# Patient Record
Sex: FEMALE | Race: WHITE | NOT HISPANIC OR LATINO | Employment: OTHER | URBAN - METROPOLITAN AREA
[De-identification: names, ages, dates, MRNs, and addresses within clinical notes are randomized per-mention and may not be internally consistent; named-entity substitution may affect disease eponyms.]

---

## 2017-01-31 ENCOUNTER — OFFICE VISIT (OUTPATIENT)
Dept: NEUROLOGY | Facility: CLINIC | Age: 52
End: 2017-01-31
Payer: COMMERCIAL

## 2017-01-31 VITALS
RESPIRATION RATE: 18 BRPM | HEART RATE: 68 BPM | WEIGHT: 187.19 LBS | HEIGHT: 60 IN | BODY MASS INDEX: 36.75 KG/M2 | DIASTOLIC BLOOD PRESSURE: 80 MMHG | SYSTOLIC BLOOD PRESSURE: 118 MMHG

## 2017-01-31 DIAGNOSIS — R41.3 MEMORY LOSS: ICD-10-CM

## 2017-01-31 DIAGNOSIS — R51.9 NONINTRACTABLE EPISODIC HEADACHE, UNSPECIFIED HEADACHE TYPE: ICD-10-CM

## 2017-01-31 DIAGNOSIS — E53.8 B12 DEFICIENCY: Primary | ICD-10-CM

## 2017-01-31 PROCEDURE — 99214 OFFICE O/P EST MOD 30 MIN: CPT | Mod: S$GLB,,, | Performed by: PSYCHIATRY & NEUROLOGY

## 2017-01-31 PROCEDURE — 1159F MED LIST DOCD IN RCRD: CPT | Mod: S$GLB,,, | Performed by: PSYCHIATRY & NEUROLOGY

## 2017-01-31 PROCEDURE — 99999 PR PBB SHADOW E&M-EST. PATIENT-LVL III: CPT | Mod: PBBFAC,,, | Performed by: PSYCHIATRY & NEUROLOGY

## 2017-01-31 RX ORDER — KETOROLAC TROMETHAMINE 10 MG/1
TABLET, FILM COATED ORAL
Qty: 10 TABLET | Refills: 1 | Status: SHIPPED | OUTPATIENT
Start: 2017-01-31 | End: 2018-05-09

## 2017-01-31 NOTE — MR AVS SNAPSHOT
[facility]  [address] [facility] [address]  Phone: [phone]  Fax: [phone]                  Lydia Aguilera   2017 10:15 AM   Office Visit    Description:  Female : 1965   Provider:  Shawn Butler MD   Department:  [facility]           Reason for Visit     Headache     Memory Loss           Diagnoses this Visit        Comments    B12 deficiency    -  Primary     Memory loss         Nonintractable episodic headache, unspecified headache type                To Do List           Goals (5 Years of Data)     None      Follow-Up and Disposition     Return in about 6 months (around 2017).       These Medications        Disp Refills Start End    ketorolac (TORADOL) 10 mg tablet 10 tablet 1 2017     Take one daily PRN for headache    Pharmacy: [facility] [id number] [place] [address] E [facility] AT [facility] Ph #: [phone]         [facility] On Call     [facility] On Call Nurse Care Line -  Assistance  Registered nurses in the [facility] On Call Center provide clinical advisement, health education, appointment booking, and other advisory services.  Call for this free service at [phone].             Medications           Message regarding Medications     Verify the changes and/or additions to your medication regime listed below are the same as discussed with your clinician today.  If any of these changes or additions are incorrect, please notify your healthcare provider.        START taking these NEW medications        Refills    ketorolac (TORADOL) 10 mg tablet 1    Sig: Take one daily PRN for headache    Class: Normal      STOP taking these medications     nortriptyline (PAMELOR) 10 MG capsule Take one nightly for 2 weeks, then may increase to 2 at night    butalbital-acetaminophen-caffeine -40 mg (FIORICET, ESGIC) -40 mg per tablet Take 1 tablet by mouth every 4 (four) hours as needed  for Pain.           Verify that the below list of medications is an accurate representation of the medications you are currently taking.  If none reported, the list may be blank. If incorrect, please contact your healthcare provider. Carry this list with you in case of emergency.           Current Medications     PHENAZOPYRIDINE HCL (AZO ORAL) Take 1 tablet by mouth continuous prn.    ketorolac (TORADOL) 10 mg tablet Take one daily PRN for headache           Clinical Reference Information           Vital Signs - Last Recorded  Most recent update: 1/31/2017 10:57 AM by Marlen Larry MA    BP Pulse Resp Ht Wt BMI    118/80 (BP Location: Left arm, Patient Position: Sitting, BP Method: Manual) 68 18 5' (1.524 m) 84.9 kg (187 lb 2.7 oz) 36.55 kg/m2      Blood Pressure          Most Recent Value    BP  118/80      Allergies as of 1/31/2017     Demerol [Meperidine]      Immunizations Administered on Date of Encounter - 1/31/2017     None

## 2017-01-31 NOTE — PROGRESS NOTES
"HPI:  Lydia Aguilera is a 51 y.o. female with  2-5 years short term memory loss and clear stress/ mood disorder with prominent depression. MRI shows encephalomalacia/ may be due to prior insult/ hx of meningitis as a child. Low normal B12 level  Patient has not been seen since 2014  She states she has some memory problems that her "off and on" but not causing her to seek medical care. She is functioning well daily- doing her own bills (actually maintaining 3 check books well) and keeping up her household. She is not working by choice.   Since the last visit, the patient had some periods without headache but currently has had headaches again at times, often on the right side at times and this is not usually severe. She has no aura with headache. Currently no light or sound sensitivity with headaches and headaches are mild. She is not requiring anything PRN headache often, but uses rare tylenol PRN.   She is not currently taking B12.  She has a long history of headaches prior. She has not had a headache for a few weeks now.   Mood has been good.     Review of Systems   Constitutional: Negative for fever.   HENT: Negative for nosebleeds.    Eyes: Negative for double vision.   Respiratory: Negative for hemoptysis.    Cardiovascular: Negative for leg swelling.   Gastrointestinal: Negative for blood in stool.   Genitourinary: Negative for hematuria.   Musculoskeletal: Negative for falls.   Skin: Negative for rash.   Neurological: Negative for seizures.   Psychiatric/Behavioral: The patient has insomnia.            Exam:  Gen Appearance, well developed/nourished in no apparent distress  CV: 2+ distal pulses with no edema or swelling  Neuro:  MS: Awake, alert,  Sustains attention. Recent recall is normal /remote memory intact, Language is full to spontaneous speech/comprehension. Fund of Knowledge is full  CN: Optic discs are flat with normal vasculature, PERRL, Extraoccular movements and visual fields are full. " Normal facial sensation and strength, Hearing symmetric, Tongue and Palate are midline and strong. Shoulder Shrug symmetric and strong.  Motor: Normal bulk, tone, no abnormal movements. 5/5 strength bilateral upper/lower extremities with 2+ reflexes and no clonus  Sensory: Romberg negative and intact to temp and vibration  Cerebellar: Finger-nose,Rapid alternating movements intact  Gait: Normal stance, no ataxia        MRI brain reports and images reviewed:FOCAL AREA OF ENCEPHALOMALACIA IN THE  RIGHT OCCIPITAL REGION; OTHERWISE, NORMAL MRI OF THE  BRAIN WITHOUT AND WITH CONTRAST    In follow up Holter and Carotid US is normal and echo showed mildly reduced ef/ she was sent to cardiologist-- appointment pending    CT head 2014: 1.  No CT evidence of an acute intracranial abnormality.  2.  Mild atrophy and encephalomalacia within the right parietal occipital region.  3.  Left sphenoid sinus disease.        Assessment/Plan: Lydia Aguilera is a 51 y.o. female with  Prior report of short term memory loss and clear stress/ mood disorder with prominent depression. MRI shows encephalomalacia/ may be due to prior insult/ hx of meningitis as a child. Low normal B12 level. Long history of episodic headaches as well  I recommend:     1. Restart 1000mcg B12 daily. Check levels at next visit. This could contribute to vague subjective memory loss. Reporting normal mood now.   2. Memory was better with better mood prior.   3. No further work up for headaches as she had had them prior and spells are rare and episodic. Notify me if worse spells or any changes. Try Toradol PRN per orders. Black box CV risk, renal and GI risk review.   4. Previously: She responded with PT for dizziness    RTC in 6 months

## 2018-05-09 ENCOUNTER — HOSPITAL ENCOUNTER (OUTPATIENT)
Dept: RADIOLOGY | Facility: HOSPITAL | Age: 53
Discharge: HOME | End: 2018-05-09
Attending: NURSE PRACTITIONER
Payer: MEDICAID

## 2018-05-09 ENCOUNTER — OFFICE VISIT (OUTPATIENT)
Dept: NEUROLOGY | Facility: CLINIC | Age: 53
End: 2018-05-09
Payer: MEDICAID

## 2018-05-09 VITALS
BODY MASS INDEX: 37.09 KG/M2 | DIASTOLIC BLOOD PRESSURE: 88 MMHG | SYSTOLIC BLOOD PRESSURE: 124 MMHG | WEIGHT: 188.94 LBS | HEIGHT: 60 IN | HEART RATE: 50 BPM | RESPIRATION RATE: 16 BRPM

## 2018-05-09 DIAGNOSIS — R00.1 BRADYCARDIA: ICD-10-CM

## 2018-05-09 DIAGNOSIS — R20.2 NUMBNESS AND TINGLING IN RIGHT HAND: ICD-10-CM

## 2018-05-09 DIAGNOSIS — R20.0 NUMBNESS AND TINGLING IN RIGHT HAND: ICD-10-CM

## 2018-05-09 PROCEDURE — 72040 X-RAY EXAM NECK SPINE 2-3 VW: CPT | Mod: 26,,, | Performed by: RADIOLOGY

## 2018-05-09 PROCEDURE — 72040 X-RAY EXAM NECK SPINE 2-3 VW: CPT | Mod: TC

## 2018-05-09 PROCEDURE — 99214 OFFICE O/P EST MOD 30 MIN: CPT | Mod: S$PBB,,, | Performed by: NURSE PRACTITIONER

## 2018-05-09 PROCEDURE — 99999 PR PBB SHADOW E&M-EST. PATIENT-LVL III: CPT | Mod: PBBFAC,,, | Performed by: NURSE PRACTITIONER

## 2018-05-09 PROCEDURE — 99213 OFFICE O/P EST LOW 20 MIN: CPT | Mod: PBBFAC,25 | Performed by: NURSE PRACTITIONER

## 2018-05-09 RX ORDER — MELOXICAM 7.5 MG/1
7.5 TABLET ORAL DAILY
COMMUNITY
End: 2019-07-25

## 2018-05-09 NOTE — PROGRESS NOTES
"HPI:  Lydia Aguilera is a 51 y.o. female with  2-5 years short term memory loss and clear stress/ mood disorder with prominent depression. MRI shows encephalomalacia/ may be due to prior insult/ hx of meningitis as a child. Low normal B12 level.     Patient presents today with complaint of right hand tingling. She has not been seen since 1/2017, and did not follow up.     Her right hand tingling began last month. She wakes up at night with numbness to her right hand. She denies any neck pain. She denies dropping items or difficulty opening items. 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     Memory and mood are "good" at this time. She is taking B12 supplements.     Headaches are resolved currently.     No dizziness.     Review of Systems   Constitutional: Negative for fever.   HENT: Negative for nosebleeds.    Eyes: Negative for double vision.   Respiratory: Negative for hemoptysis.    Cardiovascular: Negative for leg swelling.   Gastrointestinal: Negative for blood in stool.   Genitourinary: Negative for hematuria.   Musculoskeletal: Positive for neck pain. Negative for falls.   Skin: Negative for rash.   Neurological: Positive for tingling and sensory change. Negative for dizziness, tremors, speech change, focal weakness, seizures and headaches.   Psychiatric/Behavioral: Negative for depression, hallucinations and memory loss. The patient is not nervous/anxious and does not have insomnia.      Exam:  Gen Appearance, well developed/nourished in no apparent distress  CV: 2+ distal pulses with no edema or swelling  Neuro:  MS: Awake, alert,  Sustains attention. Recent recall is normal /remote memory intact, Language is full to spontaneous speech/comprehension. Fund of Knowledge is full  CN: Optic discs are flat with normal vasculature, PERRL, Extraoccular movements and visual fields are full. Normal facial sensation and strength, Hearing symmetric, Tongue and Palate are midline and strong. Shoulder Shrug " symmetric and strong.  Motor: Normal bulk, tone, no abnormal movements. 5/5 strength bilateral upper/lower extremities with 2+ reflexes and no clonus  Sensory: Romberg negative and intact to temp and vibration; negative Tinel's and Phalen's test bilaterally.   Cerebellar: Finger-nose,Rapid alternating movements intact  Gait: Normal stance, no ataxia  MSK survey: no tenderness to the T-spine    Imaging:    MRI brain reports and images reviewed:FOCAL AREA OF ENCEPHALOMALACIA IN THE  RIGHT OCCIPITAL REGION; OTHERWISE, NORMAL MRI OF THE  BRAIN WITHOUT AND WITH CONTRAST    In follow up Holter and Carotid US is normal and echo showed mildly reduced ef/ she was sent to cardiologist-- appointment pending    CT head 2014:   1.  No CT evidence of an acute intracranial abnormality.  2.  Mild atrophy and encephalomalacia within the right parietal occipital region.  3.  Left sphenoid sinus disease.    Assessment/Plan: Lydia Aguilera is a 52 y.o. female with  Prior report of short term memory loss and clear stress/ mood disorder with prominent depression. MRI shows encephalomalacia/ may be due to prior insult/ hx of meningitis as a child. Low normal B12 level. Long history of episodic headaches as wel.     I recommend:   1. EMG BUE to assess for entrapment and polyneuropathy, as well as cervical radicular disease to explain her right hand numbness.   2. C-spine X-rays to assess for degenerative changes. Consider MRI C-spine pending results.   3. Continue B12 supplements for subjective memory loss prior.   4. Headaches resolved currently. She was prescribed Toradol prn prior.   5. Previously: She responded with PT for dizziness    RTC in 3 months.

## 2018-05-17 PROBLEM — Z12.11 SCREENING FOR COLON CANCER: Status: ACTIVE | Noted: 2018-05-17

## 2018-06-22 ENCOUNTER — PROCEDURE VISIT (OUTPATIENT)
Dept: NEUROLOGY | Facility: CLINIC | Age: 53
End: 2018-06-22
Payer: MEDICAID

## 2018-06-22 DIAGNOSIS — R20.0 NUMBNESS AND TINGLING IN RIGHT HAND: ICD-10-CM

## 2018-06-22 DIAGNOSIS — G56.01 RIGHT CARPAL TUNNEL SYNDROME: Primary | ICD-10-CM

## 2018-06-22 DIAGNOSIS — R20.2 NUMBNESS AND TINGLING IN RIGHT HAND: ICD-10-CM

## 2018-06-22 PROCEDURE — 95909 NRV CNDJ TST 5-6 STUDIES: CPT | Mod: 26,S$PBB,, | Performed by: PSYCHIATRY & NEUROLOGY

## 2018-06-22 PROCEDURE — 95886 MUSC TEST DONE W/N TEST COMP: CPT | Mod: 26,S$PBB,, | Performed by: PSYCHIATRY & NEUROLOGY

## 2018-06-22 PROCEDURE — 95886 MUSC TEST DONE W/N TEST COMP: CPT | Mod: PBBFAC | Performed by: PSYCHIATRY & NEUROLOGY

## 2018-06-22 PROCEDURE — 95909 NRV CNDJ TST 5-6 STUDIES: CPT | Mod: PBBFAC | Performed by: PSYCHIATRY & NEUROLOGY

## 2018-06-22 NOTE — PROCEDURES
EMG W/ ULTRASOUND AND NERVE CONDUCTION TEST 2 Extremities  Date/Time: 6/22/2018 4:35 PM  Performed by: PRIYANKA BUTLER  Authorized by: MILTON WALTERS       REPORT OF EMG and NERVE CONDUCTION STUDY    Name: Lydia Aguilera  Date of Study:  6/22/18  Referring Provider: ALEX Walters  Test Performed by:  MD Luke  Full Values Attached  Informed Consent Scanned.   No anesthesia used.   Amount of Blood Loss: none. The patient tolerated this procedure well.       Informed consent was obtained prior to performing this study. Two patient identifiers were confirmed with the patient prior to performing this study. A time out to determine correct patient and and agreement on procedure performed was conducted prior to the concentric needle examination.    Reason for the study:        Findings: Patient refused left sided testing.  Nerve conduction studies of the right median (motor and sensory)nerves, bilateral ulnar (motor and sensory) nerves,and right  radial sensory nerve were performed.  Right median distal latency was prolonged to 5.0ms and right median palm to wrist latency was prolonged to 3.9ms.   Otherwise, amplitudes, distal latencies, conduction velocities, and F-waves were normal. H reflexes were symetric  EMG of selected muscles of the right  And right cervical paraspinal muscleswere performed as indicated on the attached sheets.  Insertional activity was normal without fasciculation or fibrillation, normal sized and phasia of motor units.      Impression:  Abnormal Study secondary to the Presence of:    Mild Right Carpal Tunnel syndrome. Left side not tested as above but is not symptomatic    Priyanka Butler M.D. [facility].     Recommendations (discussed at this visit):  Wear wrist brace for CTS nightly and RTC in 6 weeks.     CC: ALEX Walters

## 2019-07-25 ENCOUNTER — LAB VISIT (OUTPATIENT)
Dept: LAB | Facility: HOSPITAL | Age: 54
End: 2019-07-25
Attending: NURSE PRACTITIONER
Payer: MEDICAID

## 2019-07-25 ENCOUNTER — OFFICE VISIT (OUTPATIENT)
Dept: NEUROLOGY | Facility: CLINIC | Age: 54
End: 2019-07-25
Payer: MEDICAID

## 2019-07-25 VITALS
HEIGHT: 61 IN | WEIGHT: 178.13 LBS | RESPIRATION RATE: 14 BRPM | DIASTOLIC BLOOD PRESSURE: 84 MMHG | SYSTOLIC BLOOD PRESSURE: 132 MMHG | HEART RATE: 50 BPM | BODY MASS INDEX: 33.63 KG/M2

## 2019-07-25 DIAGNOSIS — R00.1 BRADYCARDIA: ICD-10-CM

## 2019-07-25 DIAGNOSIS — R47.89 WORD FINDING DIFFICULTY: ICD-10-CM

## 2019-07-25 DIAGNOSIS — Z86.61 HISTORY OF MENINGITIS: ICD-10-CM

## 2019-07-25 DIAGNOSIS — G56.01 RIGHT CARPAL TUNNEL SYNDROME: ICD-10-CM

## 2019-07-25 DIAGNOSIS — H91.92 DEAFNESS IN LEFT EAR: ICD-10-CM

## 2019-07-25 DIAGNOSIS — R41.3 MEMORY LOSS: Primary | ICD-10-CM

## 2019-07-25 DIAGNOSIS — G93.89 ENCEPHALOMALACIA: ICD-10-CM

## 2019-07-25 DIAGNOSIS — R20.0 NUMBNESS AND TINGLING IN RIGHT HAND: ICD-10-CM

## 2019-07-25 DIAGNOSIS — R20.2 NUMBNESS AND TINGLING IN RIGHT HAND: ICD-10-CM

## 2019-07-25 DIAGNOSIS — R41.3 MEMORY LOSS: ICD-10-CM

## 2019-07-25 PROBLEM — Z12.11 SCREENING FOR COLON CANCER: Status: RESOLVED | Noted: 2018-05-17 | Resolved: 2019-07-25

## 2019-07-25 LAB
ALBUMIN SERPL BCP-MCNC: 4.3 G/DL (ref 3.5–5.2)
ALP SERPL-CCNC: 90 U/L (ref 55–135)
ALT SERPL W/O P-5'-P-CCNC: 32 U/L (ref 10–44)
ANION GAP SERPL CALC-SCNC: 9 MMOL/L (ref 8–16)
AST SERPL-CCNC: 25 U/L (ref 10–40)
BASOPHILS # BLD AUTO: 0.03 K/UL (ref 0–0.2)
BASOPHILS NFR BLD: 0.5 % (ref 0–1.9)
BILIRUB SERPL-MCNC: 0.4 MG/DL (ref 0.1–1)
BUN SERPL-MCNC: 9 MG/DL (ref 6–20)
CALCIUM SERPL-MCNC: 10.2 MG/DL (ref 8.7–10.5)
CHLORIDE SERPL-SCNC: 104 MMOL/L (ref 95–110)
CO2 SERPL-SCNC: 29 MMOL/L (ref 23–29)
CREAT SERPL-MCNC: 0.8 MG/DL (ref 0.5–1.4)
DIFFERENTIAL METHOD: ABNORMAL
EOSINOPHIL # BLD AUTO: 0.2 K/UL (ref 0–0.5)
EOSINOPHIL NFR BLD: 2.8 % (ref 0–8)
ERYTHROCYTE [DISTWIDTH] IN BLOOD BY AUTOMATED COUNT: 12 % (ref 11.5–14.5)
EST. GFR  (AFRICAN AMERICAN): >60 ML/MIN/1.73 M^2
EST. GFR  (NON AFRICAN AMERICAN): >60 ML/MIN/1.73 M^2
GLUCOSE SERPL-MCNC: 89 MG/DL (ref 70–110)
HCT VFR BLD AUTO: 40.2 % (ref 37–48.5)
HGB BLD-MCNC: 13.5 G/DL (ref 12–16)
IMM GRANULOCYTES # BLD AUTO: 0.02 K/UL (ref 0–0.04)
IMM GRANULOCYTES NFR BLD AUTO: 0.3 % (ref 0–0.5)
LYMPHOCYTES # BLD AUTO: 2.8 K/UL (ref 1–4.8)
LYMPHOCYTES NFR BLD: 45.9 % (ref 18–48)
MCH RBC QN AUTO: 30.6 PG (ref 27–31)
MCHC RBC AUTO-ENTMCNC: 33.6 G/DL (ref 32–36)
MCV RBC AUTO: 91 FL (ref 82–98)
MONOCYTES # BLD AUTO: 0.4 K/UL (ref 0.3–1)
MONOCYTES NFR BLD: 6.3 % (ref 4–15)
NEUTROPHILS # BLD AUTO: 2.7 K/UL (ref 1.8–7.7)
NEUTROPHILS NFR BLD: 44.2 % (ref 38–73)
NRBC BLD-RTO: 0 /100 WBC
PLATELET # BLD AUTO: 372 K/UL (ref 150–350)
PMV BLD AUTO: 9.4 FL (ref 9.2–12.9)
POTASSIUM SERPL-SCNC: 3.8 MMOL/L (ref 3.5–5.1)
PROT SERPL-MCNC: 7.1 G/DL (ref 6–8.4)
RBC # BLD AUTO: 4.41 M/UL (ref 4–5.4)
SODIUM SERPL-SCNC: 142 MMOL/L (ref 136–145)
T4 SERPL-MCNC: 6.5 UG/DL (ref 4.5–11.5)
TSH SERPL DL<=0.005 MIU/L-ACNC: 3.2 UIU/ML (ref 0.4–4)
WBC # BLD AUTO: 6.03 K/UL (ref 3.9–12.7)

## 2019-07-25 PROCEDURE — 82607 VITAMIN B-12: CPT

## 2019-07-25 PROCEDURE — 84436 ASSAY OF TOTAL THYROXINE: CPT

## 2019-07-25 PROCEDURE — 84443 ASSAY THYROID STIM HORMONE: CPT

## 2019-07-25 PROCEDURE — 99999 PR PBB SHADOW E&M-EST. PATIENT-LVL IV: ICD-10-PCS | Mod: PBBFAC,,, | Performed by: NURSE PRACTITIONER

## 2019-07-25 PROCEDURE — 99999 PR PBB SHADOW E&M-EST. PATIENT-LVL IV: CPT | Mod: PBBFAC,,, | Performed by: NURSE PRACTITIONER

## 2019-07-25 PROCEDURE — 85025 COMPLETE CBC W/AUTO DIFF WBC: CPT

## 2019-07-25 PROCEDURE — 99214 OFFICE O/P EST MOD 30 MIN: CPT | Mod: PBBFAC | Performed by: NURSE PRACTITIONER

## 2019-07-25 PROCEDURE — 82306 VITAMIN D 25 HYDROXY: CPT

## 2019-07-25 PROCEDURE — 82746 ASSAY OF FOLIC ACID SERUM: CPT

## 2019-07-25 PROCEDURE — 86038 ANTINUCLEAR ANTIBODIES: CPT

## 2019-07-25 PROCEDURE — 80053 COMPREHEN METABOLIC PANEL: CPT

## 2019-07-25 PROCEDURE — 86592 SYPHILIS TEST NON-TREP QUAL: CPT

## 2019-07-25 PROCEDURE — 36415 COLL VENOUS BLD VENIPUNCTURE: CPT

## 2019-07-25 PROCEDURE — 99215 OFFICE O/P EST HI 40 MIN: CPT | Mod: S$PBB,,, | Performed by: NURSE PRACTITIONER

## 2019-07-25 PROCEDURE — 99215 PR OFFICE/OUTPT VISIT, EST, LEVL V, 40-54 MIN: ICD-10-PCS | Mod: S$PBB,,, | Performed by: NURSE PRACTITIONER

## 2019-07-25 RX ORDER — VITAMIN E 268 MG
400 CAPSULE ORAL DAILY
COMMUNITY

## 2019-07-25 NOTE — PROGRESS NOTES
"HPI: Lydia Aguilera is a 53 y.o. female evaluated for 2-5 years short term memory loss in 2013 with clear stress/ mood disorder with prominent depression. MRI Brain 2013 showed encephalomalacia/ may be due to prior insult/ hx of meningitis as a child. Low normal B12 level noted prior. History of headaches-resolved. History of dizziness, helped with PT prior.     She presents today to reestablish care. She was last seen in 6/2018 for paresthesias to the right hand, and was found to have right CTS. She was advised to use bracing prn, and her symptoms are resolved for the most part.     Her daughter is a patient in this clinic, Barbara Aguilera, who is present with her today.     Patient presents today for evaluation of memory loss, which began one year ago. This was first noticed by: family.   Examples include: not remembering the word she wants to say, asking the same question repeatedly even if something is written on the calendar for her to see.   Independent in ADL's, such as dressing, toileting, grooming/self care with no assistance, bill paying, cleaning, meals.     Level of education completed: 11th grade. 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   Mood is described as: euthymic, but she does become frustrated when she has word finding difficulty. Anxiety/stress: denies.   Any events that preceded onset of  memory loss: denies.     Sleeps well at night: yes Hallucinations, paranoia, delusions: denies. Falls, gait imbalance, or slowed gait: denies. Tremor: denies. Urinary loss or urgency: denies.   Any recent medication changes: denies. Driving: without incident. Cooking on a stove: without incident.   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   Denies SUMANTH or ETOH use or a history of ETOH use. Family history of dementia: unknown-she is adopted.     Prior " workup and treatment: denies this, but review of her prior records shows that she was evaluated in this clinic in 2013 for complaint of memory loss and had a great deal of stress at that time. MRI Brain showed encephalomalacia, secondary to history of meningitis, and her labs showed a B12 deficiency; however, she had a great deal of stress at the time, which was suspected as the cause of her memory complaints.     She took two months worth of Prevagen, and her  noticed an improvement to her short term memory    Her right hand tingling began last month. She wakes up at night with numbness to her right hand. She denies any neck pain. She denies dropping items or difficulty opening items. 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     Headaches are resolved currently.     No dizziness.     HR in 50's today, same as her last visit over one year ago; no related complaints.     Review of Systems   Constitutional: Negative for fever.   HENT: Positive for hearing loss. Negative for nosebleeds.         Deafness left ear   Eyes: Negative for double vision.   Respiratory: Negative for hemoptysis.    Cardiovascular: Negative for leg swelling.   Gastrointestinal: Negative for blood in stool.   Genitourinary: Negative for hematuria.   Musculoskeletal: Negative for falls and neck pain.   Skin: Negative for rash.   Neurological: Positive for tingling and sensory change. Negative for dizziness, tremors, speech change, focal weakness, seizures, loss of consciousness, weakness and headaches.        Occasional right CT complaints.    Psychiatric/Behavioral: Positive for memory loss. Negative for depression and hallucinations. The patient is not nervous/anxious and does not have insomnia.      Exam:  Gen Appearance, well developed/nourished in no apparent distress  CV: 2+ distal pulses with no edema or swelling  Neuro:  MS: Awake, alert,  Sustains attention. Recent recall is intact/remote memory intact, Language is full to  spontaneous speech/comprehension. Fund of Knowledge is full. She is unable to name the correct current President initially, then corrects. Clock drawing is excellent.   CN: Optic discs are flat with normal vasculature, PERRL, Extraoccular movements and visual fields are full. Normal facial sensation and strength, Hearing symmetric, Tongue and Palate are midline and strong. Shoulder Shrug symmetric and strong.  Motor: Normal bulk, tone, no abnormal movements. 5/5 strength bilateral upper/lower extremities with 2+ reflexes and no clonus  Sensory: Romberg negative and intact to temp and vibration; negative Tinel's and Phalen's test bilaterally.   Cerebellar: Finger-nose,Rapid alternating movements intact  Gait: Normal stance, no ataxia  MSK survey: no tenderness noted.     Imaging:  MRI Brain reports and images reviewed:FOCAL AREA OF ENCEPHALOMALACIA IN THE  RIGHT OCCIPITAL REGION; OTHERWISE, NORMAL MRI OF THE  BRAIN WITHOUT AND WITH CONTRAST    In follow up Holter and Carotid US is normal and echo showed mildly reduced ef/ she was sent to cardiologist-- appointment pending    CT head 2014:   1.  No CT evidence of an acute intracranial abnormality.  2.  Mild atrophy and encephalomalacia within the right parietal occipital region.  3.  Left sphenoid sinus disease.    Assessment/Plan: Lydia Aguilera is a 53 y.o. female evaluated for 2-5 years short term memory loss in 2013 with clear stress/ mood disorder with prominent depression. MRI Brain 2013 showed encephalomalacia/ may be due to prior insult/ hx of meningitis as a child. Low normal B12 level noted prior. History of headaches-resolved. Right hand paresthesias in 6/2018 with mild right CTS on EMG BUE.     I recommend:   1. Prior workup for similar complaints in 2013 showed encephalomalacia related to prior meningitis and a low normal B12, but stress, anxiety/depression were suspected to be the cause of her memory complaints at that time. No Neuropsych  testing at that time was completed, but was ordered.   2. Repeat MRI Brain without to evaluate for IC changes to explain her memory loss.   3. Labs to evaluate for systemic cause of her memory loss.   4. I would like to order Neuropsych testing if the above are unremarkable; however, it is unclear if this would be covered by her insurance. She is taking Prevagen supplements for her memory.   5. Continue bracing prn for right CTS. Could consider right CT injections at any point with worsening.   6. Headaches resolved currently. She was prescribed Toradol prn prior.   7. Previously: She responded with PT for dizziness prior.   8. Asymptomatic bradycardia noted.     RTC in 6 months; will call with results.

## 2019-07-25 NOTE — PATIENT INSTRUCTIONS
Please check with your insurance to determine who is covered for Neuropsychological testing to evaluate your memory loss, if your MRI Brain and labs all return normal.

## 2019-07-26 LAB
25(OH)D3+25(OH)D2 SERPL-MCNC: 28 NG/ML (ref 30–96)
ANA SER QL IF: NORMAL
FOLATE SERPL-MCNC: 9.5 NG/ML (ref 4–24)
RPR SER QL: NORMAL
VIT B12 SERPL-MCNC: 357 PG/ML (ref 210–950)

## 2019-08-01 ENCOUNTER — HOSPITAL ENCOUNTER (OUTPATIENT)
Dept: RADIOLOGY | Facility: HOSPITAL | Age: 54
Discharge: HOME | End: 2019-08-01
Attending: NURSE PRACTITIONER
Payer: MEDICAID

## 2019-08-01 ENCOUNTER — TELEPHONE (OUTPATIENT)
Dept: NEUROLOGY | Facility: CLINIC | Age: 54
End: 2019-08-01

## 2019-08-01 DIAGNOSIS — R41.3 MEMORY LOSS: Primary | ICD-10-CM

## 2019-08-01 DIAGNOSIS — R47.89 WORD FINDING DIFFICULTY: ICD-10-CM

## 2019-08-01 DIAGNOSIS — R41.3 MEMORY LOSS: ICD-10-CM

## 2019-08-01 PROCEDURE — 70551 MRI BRAIN STEM W/O DYE: CPT | Mod: TC

## 2019-08-01 PROCEDURE — 70551 MRI BRAIN WITHOUT CONTRAST: ICD-10-PCS | Mod: 26,,, | Performed by: RADIOLOGY

## 2019-08-01 PROCEDURE — 70551 MRI BRAIN STEM W/O DYE: CPT | Mod: 26,,, | Performed by: RADIOLOGY

## 2019-08-01 NOTE — TELEPHONE ENCOUNTER
Generic external orders placed. Please refer to agency provided by patients daughter for Neuropsych testing.

## 2019-08-01 NOTE — TELEPHONE ENCOUNTER
Received the following message per the patients daughters my chart:       This is not for me. This is for my mom, Lydia. I called [facility] and got one neuropsychologist from them in [place] at the [facility] [employer]. This was the only one popping up in their system. The representative did call and verify that they are taking [facility] Community Plan and are accepting new patients. They said just call them and send over the referral.     Dr. Peggy Barahona   Ph. #: [phone] [address] [place] [address]

## 2019-10-08 ENCOUNTER — TELEPHONE (OUTPATIENT)
Dept: NEUROLOGY | Facility: CLINIC | Age: 54
End: 2019-10-08

## 2019-10-08 NOTE — TELEPHONE ENCOUNTER
Patient notified, able to verbalize understanding. Appointment scheduled for 10/17/19 at 1:45 pm. Appointment slip mailed.

## 2019-10-08 NOTE — TELEPHONE ENCOUNTER
Neuropsych testing results indicate mild cognitive impairment. This does not rise to the level of dementia; however, this must be monitored over time to detect any worsening that can be seen with early dementia. Please schedule a FU with me next available to discuss further. It would be helpful to have a family member accompany her, given her report of memory loss.

## 2019-10-17 ENCOUNTER — OFFICE VISIT (OUTPATIENT)
Dept: NEUROLOGY | Facility: CLINIC | Age: 54
End: 2019-10-17
Payer: MEDICAID

## 2019-10-17 ENCOUNTER — LAB VISIT (OUTPATIENT)
Dept: LAB | Facility: HOSPITAL | Age: 54
End: 2019-10-17
Attending: NURSE PRACTITIONER
Payer: MEDICAID

## 2019-10-17 VITALS
HEART RATE: 60 BPM | DIASTOLIC BLOOD PRESSURE: 82 MMHG | BODY MASS INDEX: 34.8 KG/M2 | RESPIRATION RATE: 17 BRPM | WEIGHT: 184.31 LBS | SYSTOLIC BLOOD PRESSURE: 136 MMHG | HEIGHT: 61 IN

## 2019-10-17 DIAGNOSIS — E53.8 B12 DEFICIENCY: ICD-10-CM

## 2019-10-17 DIAGNOSIS — Z86.61 HISTORY OF MENINGITIS: ICD-10-CM

## 2019-10-17 DIAGNOSIS — G31.84 MILD COGNITIVE IMPAIRMENT: Primary | ICD-10-CM

## 2019-10-17 DIAGNOSIS — R20.0 NUMBNESS AND TINGLING IN RIGHT HAND: ICD-10-CM

## 2019-10-17 DIAGNOSIS — H91.92 DEAFNESS IN LEFT EAR: ICD-10-CM

## 2019-10-17 DIAGNOSIS — E55.9 VITAMIN D DEFICIENCY: ICD-10-CM

## 2019-10-17 DIAGNOSIS — G93.89 ENCEPHALOMALACIA: ICD-10-CM

## 2019-10-17 DIAGNOSIS — R41.3 MEMORY LOSS: ICD-10-CM

## 2019-10-17 DIAGNOSIS — R20.2 NUMBNESS AND TINGLING IN RIGHT HAND: ICD-10-CM

## 2019-10-17 DIAGNOSIS — G56.01 RIGHT CARPAL TUNNEL SYNDROME: ICD-10-CM

## 2019-10-17 PROBLEM — R00.1 BRADYCARDIA: Status: RESOLVED | Noted: 2018-05-09 | Resolved: 2019-10-17

## 2019-10-17 PROCEDURE — 36415 COLL VENOUS BLD VENIPUNCTURE: CPT

## 2019-10-17 PROCEDURE — 82607 VITAMIN B-12: CPT

## 2019-10-17 PROCEDURE — 99213 OFFICE O/P EST LOW 20 MIN: CPT | Mod: PBBFAC | Performed by: NURSE PRACTITIONER

## 2019-10-17 PROCEDURE — 99214 PR OFFICE/OUTPT VISIT, EST, LEVL IV, 30-39 MIN: ICD-10-PCS | Mod: S$PBB,,, | Performed by: NURSE PRACTITIONER

## 2019-10-17 PROCEDURE — 82306 VITAMIN D 25 HYDROXY: CPT

## 2019-10-17 PROCEDURE — 99999 PR PBB SHADOW E&M-EST. PATIENT-LVL III: CPT | Mod: PBBFAC,,, | Performed by: NURSE PRACTITIONER

## 2019-10-17 PROCEDURE — 99214 OFFICE O/P EST MOD 30 MIN: CPT | Mod: S$PBB,,, | Performed by: NURSE PRACTITIONER

## 2019-10-17 PROCEDURE — 99999 PR PBB SHADOW E&M-EST. PATIENT-LVL III: ICD-10-PCS | Mod: PBBFAC,,, | Performed by: NURSE PRACTITIONER

## 2019-10-17 RX ORDER — ATORVASTATIN CALCIUM 10 MG/1
10 TABLET, FILM COATED ORAL DAILY
Refills: 1 | COMMUNITY
Start: 2019-10-01

## 2019-10-17 RX ORDER — MELOXICAM 15 MG/1
15 TABLET ORAL DAILY
Refills: 1 | COMMUNITY
Start: 2019-09-23

## 2019-10-17 NOTE — PROGRESS NOTES
HPI: Lydia Aguilera is a 53 y.o. female evaluated for 2-5 years short term memory loss in 2013 with clear stress/ mood disorder with prominent depression. MRI Brain 2013 showed encephalomalacia/ may be due to prior insult/ hx of meningitis as a child. Low normal B12 level noted prior. History of headaches-resolved. History of dizziness, helped with PT prior.     She presents today for a follow up visit. Her daughter is a patient in this clinic, Barbara Aguilera, who is present with her today, as well as her . She completed Neuropsych testing, which suggested mild cognitive impairment as the cause of her memory loss. She completed a repeat MRI Brain, which was overall unremarkable.     Labs showed B12 level in the 300's, and she was advised to start supplementation for this, as well as Vitamin D supplements for low Vitamin D levels.     She has had improvement to her short term memory since her last visit, and has less word finding complaints. No signs of executive dysfunction at this time. Driving well without incident. Cooking on a stove without incident. 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     No depression or anxiety.     Bracing helpful right right CT complaints. She denies dropping items or difficulty opening items.     Headaches are resolved currently.     No dizziness.     Review of Systems   Constitutional: Negative for fever.   HENT: Positive for hearing loss. Negative for nosebleeds.         Deafness left ear   Eyes: Negative for double vision.   Respiratory: Negative for hemoptysis.    Cardiovascular: Negative for leg swelling.   Gastrointestinal: Negative for blood in stool.   Genitourinary: Negative for hematuria.   Musculoskeletal: Negative for falls and neck pain.   Skin: Negative for rash.   Neurological: Positive for tingling and sensory change. Negative for dizziness, tremors, speech change, focal weakness, seizures, loss of consciousness, weakness and headaches.         Occasional right CT complaints.    Psychiatric/Behavioral: Positive for memory loss. Negative for depression and hallucinations. The patient is not nervous/anxious and does not have insomnia.      Exam:  Gen Appearance, well developed/nourished in no apparent distress  CV: 2+ distal pulses with no edema or swelling  Neuro:  MS: Awake, alert,  Sustains attention. Recent recall is intact/remote memory intact, Language is full to spontaneous speech/comprehension. Fund of Knowledge is full. She is unable to name the correct current President initially, then corrects. Clock drawing is excellent.   CN: Optic discs are flat with normal vasculature, PERRL, Extraoccular movements and visual fields are full. Normal facial sensation and strength, Hearing symmetric, Tongue and Palate are midline and strong. Shoulder Shrug symmetric and strong.  Motor: Normal bulk, tone, no abnormal movements. 5/5 strength bilateral upper/lower extremities with 2+ reflexes and no clonus  Sensory: Romberg negative and intact to temp and vibration; negative Tinel's and Phalen's test bilaterally.   Cerebellar: Finger-nose,Rapid alternating movements intact  Gait: Normal stance, no ataxia  MSK survey: no tenderness noted.     Imagin2019 MRI Brain:   1.  No MRI evidence of an acute intracranial abnormality.    2.  Stable encephalomalacia in the right parietooccipital region.  Mild and minimal small vessel ischemic changes of the periventricular white matter.    MRI Brain reports and images reviewed:FOCAL AREA OF ENCEPHALOMALACIA IN THE  RIGHT OCCIPITAL REGION; OTHERWISE, NORMAL MRI OF THE  BRAIN WITHOUT AND WITH CONTRAST    In follow up Holter and Carotid US is normal and echo showed mildly reduced ef/ she was sent to cardiologist-- appointment pending    CT head 2014:   1.  No CT evidence of an acute intracranial abnormality.  2.  Mild atrophy and encephalomalacia within the right parietal occipital region.  3.  Left sphenoid sinus  disease.    Labs:  7/2019 RPR, Folate, B12, and Vitamin D reviewed-vitamin D was low and B12 was in the 300's.     Assessment/Plan: Lydia Aguilera is a 53 y.o. female evaluated for 2-5 years short term memory loss in 2013 with clear stress/ mood disorder with prominent depression. MRI Brain 2013 showed encephalomalacia/ may be due to prior insult/ hx of meningitis as a child. Low normal B12 level noted prior. History of headaches-resolved. Right hand paresthesias in 6/2018 with mild right CTS on EMG BUE.     I recommend:   1. Neuropsych testing results consistent with mild cognitive impairment. No signs of executive dysfunction at this time. She is functioning relatively well.   -will monitor for progression to early dementia, as people with dementia can have MCI prior to the onset of dementia, which was discussed today.   -instructed on healthy diet, mental/physical exercise to help memory long term.    -do not recommend starting an anticholinesterase inhibitor, as this is not indicated for MCI-only for dementia.   -repeat Neuropsych testing with any worsening of her memory.   2. MRI Brain showed no significant changes. Labs showed a relatively low B12 level, which can contribute to memory issues/word finding complaints.   3. Continue supplementation for B12 deficiency. Check level today.   4. Continues supplementation for Vitamin D deficiency. Check level today.   5. Prior workup for similar complaints in 2013 showed encephalomalacia related to prior meningitis and a low normal B12, but stress, anxiety/depression were suspected to be the cause of her memory complaints at that time. No Neuropsych testing at that time was completed, but was ordered.   6. She is taking Prevagen supplements for her memory.   7. Continue bracing prn for right CTS. Could consider right CT injections at any point with worsening.   8. Headaches resolved currently. She was prescribed Toradol prn prior.   9. Previously: She  responded with PT for dizziness prior.   10. Asymptomatic bradycardia noted.     RTC in 6 months; will call with lab results.

## 2019-10-18 LAB
25(OH)D3+25(OH)D2 SERPL-MCNC: 55 NG/ML (ref 30–96)
VIT B12 SERPL-MCNC: 1120 PG/ML (ref 210–950)

## 2020-04-16 ENCOUNTER — OFFICE VISIT (OUTPATIENT)
Dept: NEUROLOGY | Facility: CLINIC | Age: 55
End: 2020-04-16
Payer: MEDICAID

## 2020-04-16 DIAGNOSIS — G31.84 MILD COGNITIVE IMPAIRMENT: Primary | ICD-10-CM

## 2020-04-16 DIAGNOSIS — E53.8 B12 DEFICIENCY: ICD-10-CM

## 2020-04-16 DIAGNOSIS — E55.9 VITAMIN D DEFICIENCY: ICD-10-CM

## 2020-04-16 DIAGNOSIS — R20.2 NUMBNESS AND TINGLING IN RIGHT HAND: ICD-10-CM

## 2020-04-16 DIAGNOSIS — G93.89 ENCEPHALOMALACIA: ICD-10-CM

## 2020-04-16 DIAGNOSIS — H91.92 DEAFNESS IN LEFT EAR: ICD-10-CM

## 2020-04-16 DIAGNOSIS — G56.01 RIGHT CARPAL TUNNEL SYNDROME: ICD-10-CM

## 2020-04-16 DIAGNOSIS — R41.3 MEMORY LOSS: ICD-10-CM

## 2020-04-16 DIAGNOSIS — R20.0 NUMBNESS AND TINGLING IN RIGHT HAND: ICD-10-CM

## 2020-04-16 DIAGNOSIS — Z86.61 HISTORY OF MENINGITIS: ICD-10-CM

## 2020-04-16 PROCEDURE — 99214 PR OFFICE/OUTPT VISIT, EST, LEVL IV, 30-39 MIN: ICD-10-PCS | Mod: 95,,, | Performed by: NURSE PRACTITIONER

## 2020-04-16 PROCEDURE — 99214 OFFICE O/P EST MOD 30 MIN: CPT | Mod: 95,,, | Performed by: NURSE PRACTITIONER

## 2020-04-16 RX ORDER — POLYETHYLENE GLYCOL 3350 17 G/17G
POWDER, FOR SOLUTION ORAL
COMMUNITY
Start: 2020-03-24

## 2020-04-16 NOTE — PROGRESS NOTES
HPI: Lydia Aguilera is a 54 y.o. female with mild cognitive impairment on Neuropsych testing in 2019. She was initially evaluated for 2-5 years short term memory loss in 2013 with clear stress/ mood disorder with prominent depression. MRI Brain 2013 showed encephalomalacia/ may be due to prior insult/ hx of meningitis as a child. Low normal B12 level noted prior. History of headaches-resolved. History of dizziness, helped with PT prior. She works as a hospice CNA.     The patient location is: home via telemed  The chief complaint leading to consultation is: follow up visit  Visit type: telemed audio/visual  Total time spent with patient: 15 minutes    Each patient to whom he or she provides medical services by telemedicine is:  (1) informed of the relationship between the physician and patient and the respective role of any other health care provider with respect to management of the patient; and (2) notified that he or she may decline to receive medical services by telemedicine and may withdraw from such care at any time.    Notes:    She presents today for a follow up visit. No changes to her memory. Family continues to remind her about answers to questions that she has asked already.     She continues to drive and cook on a stove without incident or issue. No signs of executive dysfunction.     She continues B12 and Vitamin D supplements.     She is no longer taking Prevagen supplements OTC, but started taking Lion's darrel supplements this past week to improve cognition.     No depression, but continues with mild anxiety, per her baseline. This is chronic per family.     Bracing helpful for right CT complaints. She denies dropping items or difficulty opening items.     Headaches occur rarely.     No dizziness.     Review of Systems   Constitutional: Negative for fever.   HENT: Positive for hearing loss. Negative for nosebleeds.         Deafness left ear   Eyes: Negative for double vision.   Respiratory:  Report called to floor.      Junior Andres RN  09/24/18 3546 Negative for hemoptysis.    Cardiovascular: Negative for leg swelling.   Gastrointestinal: Negative for blood in stool.   Genitourinary: Negative for hematuria.   Musculoskeletal: Negative for falls and neck pain.   Skin: Negative for rash.   Neurological: Positive for tingling and sensory change. Negative for dizziness, tremors, speech change, focal weakness, seizures, loss of consciousness, weakness and headaches.        Occasional right CT complaints.    Psychiatric/Behavioral: Positive for memory loss. Negative for depression and hallucinations. The patient is not nervous/anxious and does not have insomnia.      Exam:  Gen Appearance, well developed/nourished in no apparent distress  CV: not evaluated  Neuro:  MS: Awake, alert, oriented to place, person, time as April, . Sustains attention. Recent/remote memory intact, Language is full to spontaneous speech/repetition/naming/comprehension. Fund of Knowledge is full  CN: Optic discs not observed, PERRL not done, Extraoccular movements and visual fields are full. Normal facial strength, Hearing adequate for telemed visit, Tongue is midline, palate not evaluated. Shoulder Shrug symmetric.  Motor: deferred due to telemed  Sensory: deferred due to telemed  Cerebellar: deferred due to telemed  Gait: deferred due to telemed    Imagin2019 MRI Brain:   1.  No MRI evidence of an acute intracranial abnormality.    2.  Stable encephalomalacia in the right parietooccipital region.  Mild and minimal small vessel ischemic changes of the periventricular white matter.    MRI Brain reports and images reviewed:FOCAL AREA OF ENCEPHALOMALACIA IN THE  RIGHT OCCIPITAL REGION; OTHERWISE, NORMAL MRI OF THE  BRAIN WITHOUT AND WITH CONTRAST    In follow up Holter and Carotid US is normal and echo showed mildly reduced ef/ she was sent to cardiologist-- appointment pending    CT head 2014:   1.  No CT evidence of an acute intracranial abnormality.  2.  Mild atrophy and  encephalomalacia within the right parietal occipital region.  3.  Left sphenoid sinus disease.    Labs:  7/2019 RPR, Folate, B12, and Vitamin D reviewed-vitamin D was low and B12 was in the 300's.   10/2019 B12 well replaced, Vitamin D normal.    Assessment/Plan: Lydia Aguilera is a 54 y.o. female evaluated for 2-5 years short term memory loss in 2013 with clear stress/ mood disorder with prominent depression. MRI Brain 2013 showed encephalomalacia/ may be due to prior insult/ hx of meningitis as a child. Low normal B12 level noted prior. History of headaches-resolved. Right hand paresthesias in 6/2018 with mild right CTS on EMG BUE.     I recommend:   1. Neuropsych testing results in 2019 were consistent with mild cognitive impairment. This was completed at [facility].   No signs of executive dysfunction at this time. She is functioning relatively well.   -Prior workup for similar complaints in 2013 showed encephalomalacia related to prior meningitis and a low normal B12, but stress, anxiety/depression were suspected to be the cause of her memory complaints at that time. No Neuropsych testing at that time was completed, but was ordered.   -will monitor for progression to early dementia, as people with dementia can have MCI prior to the onset of dementia, which was discussed today.   -instructed on healthy diet, mental/physical exercise to help memory long term.    -do not recommend starting an anticholinesterase inhibitor, as this is not indicated for MCI-only for dementia.   -repeat Neuropsych testing with any worsening of her memory.   2. MRI Brain showed no significant changes. Labs showed a relatively low B12 level, which can contribute to memory issues/word finding complaints.   3. Continue supplementation for B12 deficiency. Check level at next visit.   4. Continues supplementation for Vitamin D deficiency. Check level at next visit.   5. She is taking Lion's Chace for memory.   Started this week.   -She is no longer taking Prevagen.   6. Continue bracing prn for right CTS. Could consider right CT injections at any point with worsening.   7. Headaches resolved currently. She was prescribed Toradol prn prior.   8. Previously: She responded with PT for dizziness prior.   9. Asymptomatic bradycardia noted prior.     FU 1 year

## 2021-10-19 ENCOUNTER — OFFICE VISIT (OUTPATIENT)
Dept: URGENT CARE | Facility: CLINIC | Age: 56
End: 2021-10-19
Payer: MEDICAID

## 2021-10-19 VITALS
OXYGEN SATURATION: 97 % | TEMPERATURE: 97 F | SYSTOLIC BLOOD PRESSURE: 152 MMHG | HEART RATE: 61 BPM | DIASTOLIC BLOOD PRESSURE: 79 MMHG | BODY MASS INDEX: 32.94 KG/M2 | RESPIRATION RATE: 16 BRPM | WEIGHT: 179 LBS | HEIGHT: 62 IN